# Patient Record
Sex: MALE | Race: WHITE | NOT HISPANIC OR LATINO | Employment: FULL TIME | URBAN - METROPOLITAN AREA
[De-identification: names, ages, dates, MRNs, and addresses within clinical notes are randomized per-mention and may not be internally consistent; named-entity substitution may affect disease eponyms.]

---

## 2020-09-02 ENCOUNTER — OFFICE VISIT (OUTPATIENT)
Dept: FAMILY MEDICINE CLINIC | Facility: CLINIC | Age: 25
End: 2020-09-02

## 2020-09-02 VITALS
DIASTOLIC BLOOD PRESSURE: 80 MMHG | TEMPERATURE: 98.6 F | HEIGHT: 76 IN | WEIGHT: 286.4 LBS | HEART RATE: 77 BPM | SYSTOLIC BLOOD PRESSURE: 132 MMHG | OXYGEN SATURATION: 98 % | BODY MASS INDEX: 34.88 KG/M2

## 2020-09-02 DIAGNOSIS — E53.8 VITAMIN B12 DEFICIENCY: ICD-10-CM

## 2020-09-02 DIAGNOSIS — R06.83 SNORING: ICD-10-CM

## 2020-09-02 DIAGNOSIS — E53.8 FOLIC ACID DEFICIENCY: ICD-10-CM

## 2020-09-02 DIAGNOSIS — E55.9 VITAMIN D DEFICIENCY: ICD-10-CM

## 2020-09-02 DIAGNOSIS — Z00.00 ENCOUNTER FOR ANNUAL PHYSICAL EXAM: Primary | ICD-10-CM

## 2020-09-02 PROCEDURE — 99385 PREV VISIT NEW AGE 18-39: CPT | Performed by: NURSE PRACTITIONER

## 2020-09-02 NOTE — PATIENT INSTRUCTIONS
Continue to work on healthy diet and exercise.  Follow up pending lab results.  Follow up as needed for problems or concerns.

## 2020-09-02 NOTE — PROGRESS NOTES
Subjective   Ignacio Shore is a 25 y.o. male.     Chief Complaint   Patient presents with   • Annual Exam     blood work        History of Present Illness   New patient, here to establish care; previous PCP Dr. Laureano, has not seen for 5 years; patient presents for CPE with non-fasting labs; no problems or concerns today; moderately healthy diet, eats a lot of spicy and greasy food; regular exercise, walks 3-4 miles per night at work; no recent dental visits; last eye exam last year, wears glasses; no problems hearing; immunizations: declines flu vaccine and pneumovax; colonoscopy never performed; no family history of colon cancer.    F/U Vitamin B12 deficiency: takes Vitamin B12 rarely.    Had Glucosuria in 2013 or so; negative work up at that time; had negative glucose tolerance test; will get shaky if does not eat.    The following portions of the patient's history were reviewed and updated as appropriate: allergies, current medications, past family history, past medical history, past social history, past surgical history and problem list.    No current outpatient medications on file prior to visit.     No current facility-administered medications on file prior to visit.        Past Medical History:   Diagnosis Date   • Folate deficiency    • Nasal fracture    • Right lateral epicondylitis 5/2/2016   • Vitamin B12 deficiency    • Vitamin D deficiency        Past Surgical History:   Procedure Laterality Date   • SHOULDER SURGERY Right 03/14/2019    repair of labral tears       Family History   Problem Relation Age of Onset   • Diabetes Father    • Hypertension Father    • Cancer Maternal Aunt         unknown    • Kidney disease Paternal Grandfather    • Diabetes Paternal Grandfather    • COPD Maternal Grandmother    • Coronary artery disease Maternal Grandfather         S/P CABG       Social History     Socioeconomic History   • Marital status: Significant Other     Spouse name: Not on file   • Number of children: Not  on file   • Years of education: Not on file   • Highest education level: Not on file   Tobacco Use   • Smoking status: Current Some Day Smoker     Packs/day: 0.25     Types: Cigarettes   • Smokeless tobacco: Never Used   Substance and Sexual Activity   • Alcohol use: Never     Frequency: Never   • Drug use: Never   • Sexual activity: Yes     Partners: Female     Comment: wife    Does not smoke daily; would rather smoke than drink.    Review of Systems   Constitutional: Negative for appetite change, chills, fever, unexpected weight gain and unexpected weight loss. Fatigue: at times; not necessarily unexplained, works 56 hours per week.   HENT: Negative for ear pain, sinus pressure, sore throat and trouble swallowing.    Eyes: Negative for blurred vision and pain.   Respiratory: Negative for cough, chest tightness and shortness of breath. Apnea: does snore.    Cardiovascular: Negative for chest pain, palpitations and leg swelling.   Gastrointestinal: Negative for abdominal pain, blood in stool, constipation, diarrhea, GERD and indigestion.   Endocrine: Negative for cold intolerance, heat intolerance and polydipsia.   Genitourinary: Negative for dysuria and frequency. Nocturia: gets up 1-2 times per night to urinate; no change in urinary stream; related to amount of fluid has drank.   Musculoskeletal: Negative for arthralgias (some arthritis in shoulder) and back pain.   Skin: Negative for rash and skin lesions.   Neurological: Negative for dizziness, syncope and headache.   Hematological: Does not bruise/bleed easily.   Psychiatric/Behavioral: Negative for depressed mood. Sleep disturbance: occasional trouble falling asleep. The patient is not nervous/anxious.      PHQ-2 Depression Screening  Little interest or pleasure in doing things? 0   Feeling down, depressed, or hopeless? 0   PHQ-2 Total Score 0     Objective   Vitals:    09/02/20 1507   BP: 132/80   BP Location: Right arm   Patient Position: Sitting   Cuff  "Size: Adult   Pulse: 77   Temp: 98.6 °F (37 °C)   SpO2: 98%   Weight: 130 kg (286 lb 6.4 oz)   Height: 193 cm (76\")     Body mass index is 34.86 kg/m².    Physical Exam   Constitutional: He is oriented to person, place, and time. Vital signs are normal. He appears well-developed and well-nourished. No distress.   HENT:   Head: Normocephalic and atraumatic.   Right Ear: Tympanic membrane and external ear normal.   Left Ear: Tympanic membrane and external ear normal.   Nose: Septal deviation (to right) present. Right sinus exhibits no maxillary sinus tenderness and no frontal sinus tenderness. Left sinus exhibits no maxillary sinus tenderness and no frontal sinus tenderness.   Mouth/Throat: Oropharynx is clear and moist and mucous membranes are normal. No oropharyngeal exudate or posterior oropharyngeal erythema.   Eyes: Pupils are equal, round, and reactive to light. Conjunctivae and EOM are normal.   Neck: Normal range of motion. Neck supple. Carotid bruit is not present. No tracheal deviation present. No thyroid mass and no thyromegaly present.   Cardiovascular: Normal rate, regular rhythm, normal heart sounds and intact distal pulses.   No murmur heard.  Pulmonary/Chest: Effort normal and breath sounds normal. No respiratory distress.   Abdominal: Soft. Bowel sounds are normal. There is no hepatosplenomegaly. There is no tenderness.   Musculoskeletal: Normal range of motion. He exhibits no edema.        Cervical back: He exhibits no bony tenderness.        Thoracic back: He exhibits no bony tenderness.        Lumbar back: He exhibits no bony tenderness.   Lymphadenopathy:     He has no cervical adenopathy.   Neurological: He is alert and oriented to person, place, and time. He has normal strength and normal reflexes. No cranial nerve deficit. Coordination and gait normal.   Skin: Skin is warm and dry. No rash noted. He is not diaphoretic.   Psychiatric: He has a normal mood and affect. His behavior is normal. " Judgment and thought content normal. Cognition and memory are normal.   Nursing note and vitals reviewed.        Assessment/Plan .  Problem List Items Addressed This Visit     Vitamin B12 deficiency    Relevant Orders    Vitamin B12 & Folate    Folic acid deficiency    Relevant Orders    Vitamin B12 & Folate    Vitamin D deficiency    Relevant Orders    Vitamin D 25 Hydroxy    Snoring    Relevant Orders    Ambulatory Referral to Sleep Medicine    CBC & Differential    BMI 34.0-34.9,adult    Current Assessment & Plan     Continue to work on healthy diet and exercise.           Other Visit Diagnoses     Encounter for annual physical exam    -  Primary    Relevant Orders    Comprehensive Metabolic Panel    CBC & Differential    Lipid Panel With LDL / HDL Ratio    Vitamin B12 & Folate    Vitamin D 25 Hydroxy          Return in about 1 year (around 9/2/2021) for Annual physical.or sooner if problems or concerns.  Impression: Health maintenance visit.  Currently, eats a moderately healthy diet and has a regular exercise routine.  Colorectal cancer screening: not indicated.  Prostate cancer screening: not indicated.  Screening lab work includes: CMP, lipid.  Immunizations: declines flu vaccine and Pneumovax.  Patient was advised to be evaluated by dentist.  Advice and education were given regarding nutrition and aerobic exercise.

## 2020-09-03 DIAGNOSIS — E55.9 VITAMIN D DEFICIENCY: Primary | ICD-10-CM

## 2020-09-03 LAB
25(OH)D3+25(OH)D2 SERPL-MCNC: 24.4 NG/ML (ref 30–100)
ALBUMIN SERPL-MCNC: 4.7 G/DL (ref 4.1–5.2)
ALBUMIN/GLOB SERPL: 1.6 {RATIO} (ref 1.2–2.2)
ALP SERPL-CCNC: 51 IU/L (ref 39–117)
ALT SERPL-CCNC: 41 IU/L (ref 0–44)
AST SERPL-CCNC: 23 IU/L (ref 0–40)
BASOPHILS # BLD AUTO: 0 X10E3/UL (ref 0–0.2)
BASOPHILS NFR BLD AUTO: 0 %
BILIRUB SERPL-MCNC: <0.2 MG/DL (ref 0–1.2)
BUN SERPL-MCNC: 12 MG/DL (ref 6–20)
BUN/CREAT SERPL: 13 (ref 9–20)
CALCIUM SERPL-MCNC: 9.4 MG/DL (ref 8.7–10.2)
CHLORIDE SERPL-SCNC: 103 MMOL/L (ref 96–106)
CHOLEST SERPL-MCNC: 182 MG/DL (ref 100–199)
CO2 SERPL-SCNC: 26 MMOL/L (ref 20–29)
CREAT SERPL-MCNC: 0.92 MG/DL (ref 0.76–1.27)
EOSINOPHIL # BLD AUTO: 0.2 X10E3/UL (ref 0–0.4)
EOSINOPHIL NFR BLD AUTO: 2 %
ERYTHROCYTE [DISTWIDTH] IN BLOOD BY AUTOMATED COUNT: 13.2 % (ref 11.6–15.4)
FOLATE SERPL-MCNC: 5.9 NG/ML
GLOBULIN SER CALC-MCNC: 2.9 G/DL (ref 1.5–4.5)
GLUCOSE SERPL-MCNC: 101 MG/DL (ref 65–99)
HCT VFR BLD AUTO: 43.2 % (ref 37.5–51)
HDLC SERPL-MCNC: 30 MG/DL
HGB BLD-MCNC: 14.7 G/DL (ref 13–17.7)
IMM GRANULOCYTES # BLD AUTO: 0 X10E3/UL (ref 0–0.1)
IMM GRANULOCYTES NFR BLD AUTO: 0 %
LDLC SERPL CALC-MCNC: 102 MG/DL (ref 0–99)
LDLC/HDLC SERPL: 3.4 RATIO (ref 0–3.6)
LYMPHOCYTES # BLD AUTO: 3.2 X10E3/UL (ref 0.7–3.1)
LYMPHOCYTES NFR BLD AUTO: 35 %
MCH RBC QN AUTO: 29.6 PG (ref 26.6–33)
MCHC RBC AUTO-ENTMCNC: 34 G/DL (ref 31.5–35.7)
MCV RBC AUTO: 87 FL (ref 79–97)
MONOCYTES # BLD AUTO: 0.7 X10E3/UL (ref 0.1–0.9)
MONOCYTES NFR BLD AUTO: 8 %
NEUTROPHILS # BLD AUTO: 5.1 X10E3/UL (ref 1.4–7)
NEUTROPHILS NFR BLD AUTO: 55 %
PLATELET # BLD AUTO: 296 X10E3/UL (ref 150–450)
POTASSIUM SERPL-SCNC: 4.6 MMOL/L (ref 3.5–5.2)
PROT SERPL-MCNC: 7.6 G/DL (ref 6–8.5)
RBC # BLD AUTO: 4.96 X10E6/UL (ref 4.14–5.8)
SODIUM SERPL-SCNC: 143 MMOL/L (ref 134–144)
TRIGL SERPL-MCNC: 294 MG/DL (ref 0–149)
VIT B12 SERPL-MCNC: 498 PG/ML (ref 232–1245)
VLDLC SERPL CALC-MCNC: 50 MG/DL (ref 5–40)
WBC # BLD AUTO: 9.2 X10E3/UL (ref 3.4–10.8)

## 2020-09-03 RX ORDER — FAMOTIDINE 20 MG
1 TABLET ORAL DAILY
Start: 2020-09-03 | End: 2021-08-27 | Stop reason: SDUPTHER

## 2020-12-14 ENCOUNTER — OFFICE VISIT (OUTPATIENT)
Dept: FAMILY MEDICINE CLINIC | Facility: CLINIC | Age: 25
End: 2020-12-14

## 2020-12-14 VITALS
HEART RATE: 81 BPM | WEIGHT: 285.6 LBS | BODY MASS INDEX: 34.78 KG/M2 | OXYGEN SATURATION: 98 % | DIASTOLIC BLOOD PRESSURE: 78 MMHG | SYSTOLIC BLOOD PRESSURE: 118 MMHG | HEIGHT: 76 IN | TEMPERATURE: 98.2 F

## 2020-12-14 DIAGNOSIS — N43.3 HYDROCELE, UNSPECIFIED HYDROCELE TYPE: ICD-10-CM

## 2020-12-14 DIAGNOSIS — M25.511 CHRONIC RIGHT SHOULDER PAIN: ICD-10-CM

## 2020-12-14 DIAGNOSIS — N50.811 TESTICULAR PAIN, RIGHT: Primary | ICD-10-CM

## 2020-12-14 DIAGNOSIS — G89.29 CHRONIC RIGHT SHOULDER PAIN: ICD-10-CM

## 2020-12-14 PROCEDURE — 99214 OFFICE O/P EST MOD 30 MIN: CPT | Performed by: NURSE PRACTITIONER

## 2020-12-14 RX ORDER — IBUPROFEN 800 MG/1
800 TABLET ORAL DAILY
COMMUNITY
Start: 2020-12-11 | End: 2021-01-26

## 2020-12-14 NOTE — PATIENT INSTRUCTIONS
Do not take both Diclofenac and Ibuprofen.  Continue ice/heat to right shoulder, whichever feels better.  Follow up pending lab results.  Follow up if symptoms persist or worsen.  Follow up as scheduled with urology.

## 2020-12-14 NOTE — PROGRESS NOTES
Subjective   Ignacio Shore is a 25 y.o. male.     Chief Complaint   Patient presents with   • hospital follow up     cyst on testicles    • Shoulder Pain     FMLA       History of Present Illness   Patient presents for ER follow up; pt went to [facility] on 12/11/20 with c/o right testicular pain; sent to ER for further evaluation; had US of scrotum and noted hydrocele, no testicular torsion or epididymitis; negative for GC/C; noted blood in urine at [facility], but not in ER; gave Rx for Diclofenac; has been taking Diclofenac BID and helps a little; still having discomfort in right testicle; has sensation of squeezing of right testicle; no noted bulging; was sitting when pain first started on 12/10/20, then worse next day at work, so went to [facility]; no noted hernia in ER; no change in pain in last few days; no dysuria; no urinary frequency; no pain with lifting; had swelling of right testicle when went to [facility], but improved later in day; right side was double size of other when decided to go to the ER; off work at this point; ER recommended follow up with urology; has upcoming appointment with urology on 12/16/20.    Also, c/o right shoulder pain; has been waking up 2-3 times per month with decreased ROM; will take a couple of hours to get ROM back; had surgery on right shoulder last year; had labral tear repair in past; no recent follow up with ortho; at last appointment told at best recovery could get; told arthritis will contribute to symptoms; works as a  and does variety of jobs; does lifting, pushing, pulling, etc at times; works at [employer]; gets flares of shoulder pain about once per month; flare lasts for few days; symptoms will improve with rest; takes OTC NSAIDs as needed and helps some; will take Ibuprofen 200-400 mg as needed; tries ice/heat at times and helps; only has weakness in right shoulder when pain is flared; does repetitive motion at work; arthritis is not workman's comp as was  pre-existing; needs [facility] paper work completed.    The following portions of the patient's history were reviewed and updated as appropriate: allergies, current medications, past family history, past medical history, past social history, past surgical history and problem list.    Current Outpatient Medications on File Prior to Visit   Medication Sig   • diclofenac (VOLTAREN) 50 MG EC tablet Take 50 mg by mouth 2 (two) times a day.   • ibuprofen (ADVIL,MOTRIN) 800 MG tablet Take 800 mg by mouth Daily.   • Vitamin D, Cholecalciferol, 25 MCG (1000 UT) capsule Take 1 capsule by mouth Daily.     No current facility-administered medications on file prior to visit.        Past Medical History:   Diagnosis Date   • Abnormal LFTs (liver function tests) 7/12/2016   • Folate deficiency    • Nasal fracture    • Renal glycosuria 8/6/2014   • Right lateral epicondylitis 5/2/2016   • Vitamin B12 deficiency    • Vitamin D deficiency        Past Surgical History:   Procedure Laterality Date   • SHOULDER SURGERY Right 03/14/2019    repair of labral tears       Family History   Problem Relation Age of Onset   • Diabetes Father    • Hypertension Father    • Cancer Maternal Aunt         unknown    • Kidney disease Paternal Grandfather    • Diabetes Paternal Grandfather    • COPD Maternal Grandmother    • Coronary artery disease Maternal Grandfather         S/P CABG       Social History     Socioeconomic History   • Marital status: Significant Other     Spouse name: Not on file   • Number of children: Not on file   • Years of education: Not on file   • Highest education level: Not on file   Tobacco Use   • Smoking status: Current Some Day Smoker     Packs/day: 0.25     Types: Cigarettes   • Smokeless tobacco: Never Used   Substance and Sexual Activity   • Alcohol use: Never     Frequency: Never   • Drug use: Never   • Sexual activity: Yes     Partners: Female     Comment: wife        Review of Systems   Constitutional: Negative for  "appetite change, chills, fatigue, fever, unexpected weight gain and unexpected weight loss.   HENT: Negative for ear pain and sore throat.    Eyes: Negative for blurred vision (wears glasses) and pain.   Respiratory: Negative for cough, chest tightness and shortness of breath.    Cardiovascular: Negative for chest pain, palpitations and leg swelling.   Gastrointestinal: Negative for abdominal pain, blood in stool, constipation, diarrhea, GERD and indigestion.   Endocrine: Negative for polydipsia.   Genitourinary: Positive for hematuria. Negative for difficulty urinating. Scrotal swelling: mild, not as bad since 12/11/20.   Musculoskeletal: Negative for back pain.   Skin: Negative for rash.   Neurological: Negative for dizziness, syncope and headache.   Hematological: Does not bruise/bleed easily.       Objective   Vitals:    12/14/20 1033   BP: 118/78   BP Location: Left arm   Patient Position: Sitting   Cuff Size: Adult   Pulse: 81   Temp: 98.2 °F (36.8 °C)   SpO2: 98%   Weight: 130 kg (285 lb 9.6 oz)   Height: 193 cm (76\")     Body mass index is 34.76 kg/m².    Physical Exam  Vitals signs and nursing note reviewed.   Constitutional:       General: He is not in acute distress.     Appearance: He is well-developed. He is not diaphoretic.   HENT:      Head: Normocephalic.      Right Ear: External ear normal.      Left Ear: External ear normal.   Eyes:      Conjunctiva/sclera: Conjunctivae normal.   Neck:      Musculoskeletal: Normal range of motion and neck supple.      Vascular: No carotid bruit.   Cardiovascular:      Rate and Rhythm: Normal rate and regular rhythm.      Pulses: Normal pulses.      Heart sounds: Normal heart sounds. No murmur.   Pulmonary:      Effort: Pulmonary effort is normal. No respiratory distress.      Breath sounds: Normal breath sounds.   Abdominal:      General: Bowel sounds are normal.      Palpations: Abdomen is soft. There is no hepatomegaly or splenomegaly.      Tenderness: There is " no abdominal tenderness. There is no right CVA tenderness or left CVA tenderness.      Hernia: There is no hernia in the left inguinal area or right inguinal area.   Genitourinary:     Scrotum/Testes:         Right: Tenderness (mild, posterior) present. Mass or swelling not present.         Left: Mass, tenderness or swelling not present.      Epididymis:      Right: Not inflamed.      Left: Not inflamed.      Comments: MA present during exam  Musculoskeletal:      Right shoulder: He exhibits normal range of motion (negative empty can; negative drop arm) and no tenderness.      Right lower leg: No edema.      Left lower leg: No edema.   Skin:     General: Skin is warm and dry.      Findings: No rash.   Neurological:      Mental Status: He is alert and oriented to person, place, and time.      Gait: Gait is intact.   Psychiatric:         Mood and Affect: Mood normal.         Behavior: Behavior normal.         Thought Content: Thought content normal.         Cognition and Memory: Cognition normal.         Judgment: Judgment normal.         Lab Results   Component Value Date    WBC 9.2 09/02/2020    RBC 4.96 09/02/2020    HGB 14.7 09/02/2020    HCT 43.2 09/02/2020    MCV 87 09/02/2020    MCH 29.6 09/02/2020    MCHC 34.0 09/02/2020    RDW 13.2 09/02/2020     09/02/2020    NEUTRORELPCT 55 09/02/2020    LYMPHORELPCT 35 09/02/2020    MONORELPCT 8 09/02/2020    EOSRELPCT 2 09/02/2020    BASORELPCT 0 09/02/2020    NEUTROABS 5.1 09/02/2020    LYMPHSABS 3.2 (H) 09/02/2020    MONOSABS 0.7 09/02/2020    EOSABS 0.2 09/02/2020    BASOSABS 0.0 09/02/2020     Lab Results   Component Value Date    BUN 12 09/02/2020    CREATININE 0.92 09/02/2020    EGFRIFNONA 114 09/02/2020    EGFRIFAFRI 132 09/02/2020    BCR 13 09/02/2020    K 4.6 09/02/2020    CO2 26 09/02/2020    CALCIUM 9.4 09/02/2020    PROTENTOTREF 7.6 09/02/2020    ALBUMIN 4.7 09/02/2020    LABIL2 1.6 09/02/2020    AST 23 09/02/2020    ALT 41 09/02/2020      Lab Results    Component Value Date    CHLPL 182 09/02/2020    TRIG 294 (H) 09/02/2020    HDL 30 (L) 09/02/2020    VLDL 50 (H) 09/02/2020     (H) 09/02/2020     Records reviewed from [facility] on 12/11/20.    Assessment/Plan .  Problem List Items Addressed This Visit     Chronic right shoulder pain    Current Assessment & Plan     Continue ice/heat to right shoulder, whichever feels better.         Testicular pain, right - Primary    Current Assessment & Plan     Do not take both Diclofenac and Ibuprofen.  Follow up as scheduled with urology on 12/16/20.         Relevant Orders    Comprehensive Metabolic Panel    CBC & Differential    Hydrocele    Current Assessment & Plan     Follow up as scheduled with urology.               Return if symptoms worsen or fail to improve.  Off work until sees urology on 11/16/20.  Will complete FMLA paper work when received.

## 2020-12-15 PROBLEM — N43.3 HYDROCELE: Status: ACTIVE | Noted: 2020-12-15

## 2020-12-15 LAB
ALBUMIN SERPL-MCNC: 4.8 G/DL (ref 4.1–5.2)
ALBUMIN/GLOB SERPL: 1.7 {RATIO} (ref 1.2–2.2)
ALP SERPL-CCNC: 54 IU/L (ref 39–117)
ALT SERPL-CCNC: 32 IU/L (ref 0–44)
AST SERPL-CCNC: 20 IU/L (ref 0–40)
BASOPHILS # BLD AUTO: 0 X10E3/UL (ref 0–0.2)
BASOPHILS NFR BLD AUTO: 0 %
BILIRUB SERPL-MCNC: <0.2 MG/DL (ref 0–1.2)
BUN SERPL-MCNC: 6 MG/DL (ref 6–20)
BUN/CREAT SERPL: 8 (ref 9–20)
CALCIUM SERPL-MCNC: 9.8 MG/DL (ref 8.7–10.2)
CHLORIDE SERPL-SCNC: 103 MMOL/L (ref 96–106)
CO2 SERPL-SCNC: 25 MMOL/L (ref 20–29)
CREAT SERPL-MCNC: 0.8 MG/DL (ref 0.76–1.27)
EOSINOPHIL # BLD AUTO: 0.2 X10E3/UL (ref 0–0.4)
EOSINOPHIL NFR BLD AUTO: 2 %
ERYTHROCYTE [DISTWIDTH] IN BLOOD BY AUTOMATED COUNT: 13 % (ref 11.6–15.4)
GLOBULIN SER CALC-MCNC: 2.9 G/DL (ref 1.5–4.5)
GLUCOSE SERPL-MCNC: 79 MG/DL (ref 65–99)
HCT VFR BLD AUTO: 43.8 % (ref 37.5–51)
HGB BLD-MCNC: 14.9 G/DL (ref 13–17.7)
IMM GRANULOCYTES # BLD AUTO: 0 X10E3/UL (ref 0–0.1)
IMM GRANULOCYTES NFR BLD AUTO: 0 %
LYMPHOCYTES # BLD AUTO: 3.1 X10E3/UL (ref 0.7–3.1)
LYMPHOCYTES NFR BLD AUTO: 39 %
MCH RBC QN AUTO: 28.7 PG (ref 26.6–33)
MCHC RBC AUTO-ENTMCNC: 34 G/DL (ref 31.5–35.7)
MCV RBC AUTO: 84 FL (ref 79–97)
MONOCYTES # BLD AUTO: 0.7 X10E3/UL (ref 0.1–0.9)
MONOCYTES NFR BLD AUTO: 9 %
NEUTROPHILS # BLD AUTO: 4 X10E3/UL (ref 1.4–7)
NEUTROPHILS NFR BLD AUTO: 50 %
PLATELET # BLD AUTO: 317 X10E3/UL (ref 150–450)
POTASSIUM SERPL-SCNC: 4.8 MMOL/L (ref 3.5–5.2)
PROT SERPL-MCNC: 7.7 G/DL (ref 6–8.5)
RBC # BLD AUTO: 5.19 X10E6/UL (ref 4.14–5.8)
SODIUM SERPL-SCNC: 142 MMOL/L (ref 134–144)
WBC # BLD AUTO: 8.1 X10E3/UL (ref 3.4–10.8)

## 2020-12-16 ENCOUNTER — TELEPHONE (OUTPATIENT)
Dept: FAMILY MEDICINE CLINIC | Facility: CLINIC | Age: 25
End: 2020-12-16

## 2020-12-16 NOTE — TELEPHONE ENCOUNTER
Patient called to dennis [place] know that he went to the urologist today and they have diagnosed him with epididymitis. He has been started on antibiotics.

## 2021-01-26 ENCOUNTER — OFFICE VISIT (OUTPATIENT)
Dept: FAMILY MEDICINE CLINIC | Facility: CLINIC | Age: 26
End: 2021-01-26

## 2021-01-26 VITALS
SYSTOLIC BLOOD PRESSURE: 122 MMHG | HEART RATE: 89 BPM | TEMPERATURE: 97.8 F | HEIGHT: 76 IN | WEIGHT: 291.2 LBS | BODY MASS INDEX: 35.46 KG/M2 | OXYGEN SATURATION: 95 % | DIASTOLIC BLOOD PRESSURE: 78 MMHG

## 2021-01-26 DIAGNOSIS — N45.1 EPIDIDYMITIS, RIGHT: Primary | ICD-10-CM

## 2021-01-26 DIAGNOSIS — N50.811 TESTICULAR PAIN, RIGHT: ICD-10-CM

## 2021-01-26 PROCEDURE — 99213 OFFICE O/P EST LOW 20 MIN: CPT | Performed by: NURSE PRACTITIONER

## 2021-01-26 NOTE — PATIENT INSTRUCTIONS
Continue to avoid lifting.  Follow up as schedule with urology.  Follow up if symptoms persist or worsen.

## 2021-01-26 NOTE — PROGRESS NOTES
Subjective   Ignacio Shore is a 25 y.o. male.     Chief Complaint   Patient presents with   • Male  Problem     needs FMLA completed for upcoming surgery       History of Present Illness   Patient presents for follow up epididymitis; saw urology; treated for epididymitis; started on Doxycycline and Oxaprozin twice daily; took medication and no improvement of symptoms; had follow up and then did a block; did some tests and told needs surgery for epididymis removal; the more active he is, the more discomfort he has; did self check last night as instructed and noted some swelling of epididymis; will report to urology; has upcoming surgery on 2/5/21; takes Daypro and Tylenol as needed and help some; still having discomfort; symptoms worse with standing, bending, and walking; has been trying to avoid lifting at work; needs FMLA paper work completed to be off work until surgery; cannot return to work after test for COVID-19 on 1/28/21; needs off work until surgery; urology will complete FMLA post op.    The following portions of the patient's history were reviewed and updated as appropriate: allergies, current medications, past family history, past medical history, past social history, past surgical history and problem list.    Current Outpatient Medications on File Prior to Visit   Medication Sig   • oxaprozin (DAYPRO) 600 MG tablet Take 600 mg by mouth Every 12 (Twelve) Hours.   • Vitamin D, Cholecalciferol, 25 MCG (1000 UT) capsule Take 1 capsule by mouth Daily.   • [DISCONTINUED] diclofenac (VOLTAREN) 50 MG EC tablet Take 50 mg by mouth 2 (two) times a day.   • [DISCONTINUED] ibuprofen (ADVIL,MOTRIN) 800 MG tablet Take 800 mg by mouth Daily.     No current facility-administered medications on file prior to visit.        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/2014   • Right lateral  epicondylitis 5/2/2016   • Vitamin B12 deficiency    • Vitamin D deficiency        Past Surgical History:   Procedure Laterality Date   • SHOULDER SURGERY Right 03/14/2019    repair of labral tears       Family History   Problem Relation Age of Onset   • Diabetes Father    • Hypertension Father    • Cancer Maternal Aunt         unknown    • Kidney disease Paternal Grandfather    • Diabetes Paternal Grandfather    • COPD Maternal Grandmother    • Coronary artery disease Maternal Grandfather         S/P CABG       Social History     Socioeconomic History   • Marital status: Significant Other     Spouse name: Not on file   • Number of children: Not on file   • Years of education: Not on file   • Highest education level: Not on file   Tobacco Use   • Smoking status: Current Some Day Smoker     Packs/day: 0.25     Types: Cigarettes   • Smokeless tobacco: Never Used   Substance and Sexual Activity   • Alcohol use: Never     Frequency: Never   • Drug use: Never   • Sexual activity: Yes     Partners: Female     Comment: wife        Review of Systems   Constitutional: Negative for appetite change (some decrease in appetite when having pain), chills, fever, unexpected weight gain and unexpected weight loss. Fatigue: no unexplained fatigue.   HENT: Negative for ear pain, sinus pressure, sore throat and trouble swallowing.    Eyes: Negative for blurred vision and pain.   Respiratory: Negative for cough, chest tightness and shortness of breath (no dyspnea).    Cardiovascular: Negative for chest pain, palpitations and leg swelling.   Gastrointestinal: Negative for abdominal pain, blood in stool, constipation, diarrhea, GERD and indigestion.   Endocrine: Negative for polydipsia.   Genitourinary: Positive for scrotal swelling (right sided at times, rest helps). Negative for dysuria, frequency (on occasion) and hematuria.   Musculoskeletal: Negative for back pain.   Skin: Negative for rash.   Neurological: Negative for dizziness,  "syncope and headache.   Hematological: Does not bruise/bleed easily.       Objective   Vitals:    01/26/21 1303   BP: 122/78   BP Location: Right arm   Patient Position: Sitting   Cuff Size: Adult   Pulse: 89   Temp: 97.8 °F (36.6 °C)   SpO2: 95%   Weight: 132 kg (291 lb 3.2 oz)   Height: 193 cm (76\")     Body mass index is 35.45 kg/m².    Physical Exam  Vitals signs and nursing note reviewed.   Constitutional:       General: He is not in acute distress.     Appearance: He is well-developed. He is not diaphoretic.   HENT:      Head: Normocephalic.      Right Ear: External ear normal.      Left Ear: External ear normal.   Eyes:      Conjunctiva/sclera: Conjunctivae normal.   Neck:      Musculoskeletal: Normal range of motion and neck supple.      Vascular: No carotid bruit.   Cardiovascular:      Rate and Rhythm: Normal rate and regular rhythm.      Pulses: Normal pulses.      Heart sounds: Normal heart sounds. No murmur.      Comments: No JVD  Pulmonary:      Effort: Pulmonary effort is normal. No respiratory distress.      Breath sounds: Normal breath sounds.   Abdominal:      General: Bowel sounds are normal.      Palpations: Abdomen is soft. There is no hepatomegaly or splenomegaly.      Tenderness: There is no abdominal tenderness.   Musculoskeletal:      Right lower leg: No edema.      Left lower leg: No edema.   Skin:     General: Skin is warm and dry.      Findings: No rash.   Neurological:      Mental Status: He is alert and oriented to person, place, and time.      Gait: Gait is intact.   Psychiatric:         Mood and Affect: Mood normal.         Behavior: Behavior normal.         Thought Content: Thought content normal.         Cognition and Memory: Cognition normal.         Judgment: Judgment normal.         Lab Results   Component Value Date    WBC 8.1 12/14/2020    RBC 5.19 12/14/2020    HGB 14.9 12/14/2020    HCT 43.8 12/14/2020    MCV 84 12/14/2020    MCH 28.7 12/14/2020    MCHC 34.0 12/14/2020    RDW " 13.0 12/14/2020     12/14/2020    NEUTRORELPCT 50 12/14/2020    LYMPHORELPCT 39 12/14/2020    MONORELPCT 9 12/14/2020    EOSRELPCT 2 12/14/2020    BASORELPCT 0 12/14/2020    NEUTROABS 4.0 12/14/2020    LYMPHSABS 3.1 12/14/2020    MONOSABS 0.7 12/14/2020    EOSABS 0.2 12/14/2020    BASOSABS 0.0 12/14/2020     Lab Results   Component Value Date    BUN 6 12/14/2020    CREATININE 0.80 12/14/2020    EGFRIFNONA 123 12/14/2020    EGFRIFAFRI 143 12/14/2020    BCR 8 (L) 12/14/2020    K 4.8 12/14/2020    CO2 25 12/14/2020    CALCIUM 9.8 12/14/2020    PROTENTOTREF 7.7 12/14/2020    ALBUMIN 4.8 12/14/2020    LABIL2 1.7 12/14/2020    AST 20 12/14/2020    ALT 32 12/14/2020      Lab Results   Component Value Date    CHLPL 182 09/02/2020    TRIG 294 (H) 09/02/2020    HDL 30 (L) 09/02/2020    VLDL 50 (H) 09/02/2020     (H) 09/02/2020         Assessment/Plan .  Problem List Items Addressed This Visit     Testicular pain, right    Current Assessment & Plan     Continue Daypro and Tylenol as needed.         Epididymitis, right - Primary    Current Assessment & Plan     Continue to monitor symptoms.  Follow up as scheduled with urology.               Return if symptoms worsen or fail to improve.   Paper work completed for [facility] for upcoming surgery.  Cleared for surgery from medical standpoint.  Will request office note from [facility], Dr. Soares.       COVID-19 Precautions - Patient was compliant in wearing a mask. When I saw the patient, I used appropriate personal protective equipment (PPE) including mask, gloves, and eye shield (standard procedure).  Hand hygiene was completed before and after seeing the patient.

## 2021-02-05 PROCEDURE — 88304 TISSUE EXAM BY PATHOLOGIST: CPT | Performed by: UROLOGY

## 2021-02-08 ENCOUNTER — LAB REQUISITION (OUTPATIENT)
Dept: LAB | Facility: HOSPITAL | Age: 26
End: 2021-02-08

## 2021-02-08 DIAGNOSIS — N50.82 SCROTAL PAIN: ICD-10-CM

## 2021-02-08 DIAGNOSIS — N45.1 EPIDIDYMITIS: ICD-10-CM

## 2021-02-09 LAB
LAB AP CASE REPORT: NORMAL
PATH REPORT.FINAL DX SPEC: NORMAL
PATH REPORT.GROSS SPEC: NORMAL

## 2021-08-25 ENCOUNTER — OFFICE VISIT (OUTPATIENT)
Dept: FAMILY MEDICINE CLINIC | Facility: CLINIC | Age: 26
End: 2021-08-25

## 2021-08-25 VITALS
HEART RATE: 79 BPM | SYSTOLIC BLOOD PRESSURE: 128 MMHG | HEIGHT: 76 IN | WEIGHT: 294 LBS | BODY MASS INDEX: 35.8 KG/M2 | OXYGEN SATURATION: 96 % | TEMPERATURE: 99.1 F | DIASTOLIC BLOOD PRESSURE: 82 MMHG

## 2021-08-25 DIAGNOSIS — Z00.00 ENCOUNTER FOR ANNUAL PHYSICAL EXAM: Primary | ICD-10-CM

## 2021-08-25 DIAGNOSIS — E53.8 VITAMIN B12 DEFICIENCY: ICD-10-CM

## 2021-08-25 DIAGNOSIS — R06.83 SNORING: ICD-10-CM

## 2021-08-25 DIAGNOSIS — G47.9 SLEEP DISTURBANCE: ICD-10-CM

## 2021-08-25 DIAGNOSIS — G89.29 CHRONIC RIGHT SHOULDER PAIN: ICD-10-CM

## 2021-08-25 DIAGNOSIS — N50.811 TESTICULAR PAIN, RIGHT: ICD-10-CM

## 2021-08-25 DIAGNOSIS — M25.511 CHRONIC RIGHT SHOULDER PAIN: ICD-10-CM

## 2021-08-25 DIAGNOSIS — E66.9 CLASS 2 OBESITY WITHOUT SERIOUS COMORBIDITY WITH BODY MASS INDEX (BMI) OF 35.0 TO 35.9 IN ADULT, UNSPECIFIED OBESITY TYPE: ICD-10-CM

## 2021-08-25 DIAGNOSIS — E55.9 VITAMIN D DEFICIENCY: ICD-10-CM

## 2021-08-25 PROCEDURE — 99395 PREV VISIT EST AGE 18-39: CPT | Performed by: NURSE PRACTITIONER

## 2021-08-25 RX ORDER — METHYLPREDNISOLONE 4 MG/1
TABLET ORAL
COMMUNITY
Start: 2021-08-20

## 2021-08-25 NOTE — PATIENT INSTRUCTIONS
Continue to work on healthy diet and exercise.  Continue Ibuprofen 800 mg three times daily as needed during flares of shoulder pain.  Follow up pending lab results.  Follow up if symptoms persist or worsen.

## 2021-08-25 NOTE — PROGRESS NOTES
"Subjective   Ignacio Shore is a 26 y.o. male.     Chief Complaint   Patient presents with   • Annual Exam       History of Present Illness   Patient presents for CPE with fasting labs; pretty healthy diet, doing better recently; no formal exercise, walks for 12 hours nightly at work; no recent dental visits; last eye exam about 1 year ago, has upcoming eye exam; wears glasses; no problems hearing; immunizations: declines Pneumovax; colonoscopy never performed; no family history of colon cancer.    F/U right shoulder pain: has seen ortho through workmans comp in past for shoulder injury; has arthritis in right shoulder and gets flares at times; these symptoms different than when had injury; will have decreased strength in right arm at times; had some discomfort last week, has not had time to rest and recover since work has been busy; then had to do some more physical jobs at work on 8/20/21 and symptoms worse; does repetitive motion at work; had to leave work due to pain on 8/20/21; had taken Ibuprofen 400 mg, then 2 hours later took Tylenol 1000 mg, then 2 hours later took 800 mg of Ibuprofen and did not help, so had to leave work; put ice on shoulder and helped some; will have decreased ROM of shoulder after increased activity; has FMLA for flares of shoulder pain, but has used too many days this month; has been more busy at work, so has not been getting enough rest between shifts; works 12 hour shifts; needs FMLA paper work amended.     Also, c/o snoring; has been having night terrors and waking up in \"defense mode;\" would like referral for sleep study.    F/U right testicular pain: saw urology; had surgery--epididyectomy; still having flares of pain when active; not constant pain, but will have flares at times; takes Daypro as needed for discomfort and helps.    The following portions of the patient's history were reviewed and updated as appropriate: allergies, current medications, past family history, past medical " history, past social history, past surgical history and problem list.    Current Outpatient Medications on File Prior to Visit   Medication Sig   • Cyanocobalamin (VITAMIN B-12 PO) Take 1 tablet by mouth Daily As Needed.   • methylPREDNISolone (MEDROL) 4 MG dose pack follow package directions   • oxaprozin (DAYPRO) 600 MG tablet Take 600 mg by mouth Every 12 (Twelve) Hours.   • Vitamin D, Cholecalciferol, 25 MCG (1000 UT) capsule Take 1 capsule by mouth Daily.     No current facility-administered medications on file prior to visit.        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/2014   • Right lateral epicondylitis 5/2/2016   • Vitamin B12 deficiency    • Vitamin D deficiency        Past Surgical History:   Procedure Laterality Date   • EPIDIDYMECTOMY  02/2021   • SHOULDER SURGERY Right 03/14/2019    repair of labral tears       Family History   Problem Relation Age of Onset   • Diabetes Father    • Hypertension Father    • Cancer Maternal Aunt         unknown    • Kidney disease Paternal Grandfather    • Diabetes Paternal Grandfather    • COPD Maternal Grandmother    • Coronary artery disease Maternal Grandfather         S/P CABG       Social History     Socioeconomic History   • Marital status: Significant Other     Spouse name: Not on file   • Number of children: Not on file   • Years of education: Not on file   • Highest education level: Not on file   Tobacco Use   • Smoking status: Current Some Day Smoker     Packs/day: 0.25     Types: Cigarettes   • Smokeless tobacco: Never Used   Substance and Sexual Activity   • Alcohol use: Never   • Drug use: Never   • Sexual activity: Yes     Partners: Female     Comment: wife        Review of Systems   Constitutional: Positive for fatigue. Negative for appetite change, chills, fever, unexpected weight gain and unexpected weight loss.   HENT:  "Negative for ear pain, sinus pressure, sore throat and trouble swallowing.    Eyes: Negative for blurred vision and pain.   Respiratory: Negative for cough, chest tightness and shortness of breath.    Cardiovascular: Negative for chest pain, palpitations and leg swelling.   Gastrointestinal: Negative for abdominal pain, blood in stool, constipation, diarrhea, GERD and indigestion.   Endocrine: Negative for cold intolerance, heat intolerance and polydipsia.   Genitourinary: Negative for dysuria and frequency. Nocturia: gets up once per night to urinate, drinks liquids before bed.   Musculoskeletal: Negative for back pain (mild in lower back, nothing unexplained with job). Arthralgias: right shoulder.   Skin: Negative for rash and skin lesions.   Neurological: Negative for dizziness, syncope, light-headedness and headache.   Hematological: Does not bruise/bleed easily.   Psychiatric/Behavioral: Negative for depressed mood. Sleep disturbance: see HPI. Nervous/anxious: mild anxiety, able to work through, normal.        Objective   Vitals:    08/25/21 1422   BP: 128/82   BP Location: Left arm   Patient Position: Sitting   Cuff Size: Large Adult   Pulse: 79   Temp: 99.1 °F (37.3 °C)   TempSrc: Infrared   SpO2: 96%   Weight: 133 kg (294 lb)   Height: 193 cm (76\")   PainSc:   2   PainLoc: Shoulder     Body mass index is 35.79 kg/m².    Physical Exam  Vitals and nursing note reviewed.   Constitutional:       General: He is not in acute distress.     Appearance: He is well-developed and well-groomed. He is not diaphoretic.   HENT:      Head: Normocephalic and atraumatic.      Jaw: No tenderness or pain on movement.      Right Ear: Tympanic membrane and external ear normal. No decreased hearing noted.      Left Ear: Tympanic membrane and external ear normal. No decreased hearing noted.      Nose: Nose normal.      Right Sinus: No maxillary sinus tenderness or frontal sinus tenderness.      Left Sinus: No maxillary sinus " tenderness or frontal sinus tenderness.      Mouth/Throat:      Mouth: Mucous membranes are moist.      Pharynx: No oropharyngeal exudate or posterior oropharyngeal erythema.   Eyes:      Extraocular Movements: Extraocular movements intact.      Conjunctiva/sclera: Conjunctivae normal.      Pupils: Pupils are equal, round, and reactive to light.   Neck:      Thyroid: No thyroid mass or thyromegaly.      Vascular: No carotid bruit.      Trachea: No tracheal deviation.   Cardiovascular:      Rate and Rhythm: Normal rate and regular rhythm.      Pulses: Normal pulses.      Heart sounds: Normal heart sounds. No murmur heard.     Pulmonary:      Effort: Pulmonary effort is normal. No respiratory distress.      Breath sounds: Normal breath sounds.   Abdominal:      General: Bowel sounds are normal.      Palpations: Abdomen is soft. There is no hepatomegaly or splenomegaly.      Tenderness: There is no abdominal tenderness. There is no guarding.   Musculoskeletal:         General: Normal range of motion.      Right shoulder: Tenderness (with palpation of anterior shoulder) present. Normal range of motion (negative empty can, negative drop arm). Normal pulse.      Left shoulder: Normal pulse.      Cervical back: Normal range of motion and neck supple. No bony tenderness.      Thoracic back: No bony tenderness.      Lumbar back: No bony tenderness.      Right lower leg: No edema.      Left lower leg: No edema.   Lymphadenopathy:      Cervical: No cervical adenopathy.   Skin:     General: Skin is warm and dry.      Findings: No rash.   Neurological:      Mental Status: He is alert and oriented to person, place, and time.      Cranial Nerves: No cranial nerve deficit.      Motor: Motor function is intact.      Coordination: Coordination normal.      Gait: Gait normal.      Deep Tendon Reflexes: Reflexes are normal and symmetric.   Psychiatric:         Mood and Affect: Mood normal.         Behavior: Behavior normal.          Thought Content: Thought content normal.         Cognition and Memory: Cognition normal.         Judgment: Judgment normal.         Lab Results   Component Value Date    WBC 8.1 12/14/2020    RBC 5.19 12/14/2020    HGB 14.9 12/14/2020    HCT 43.8 12/14/2020    MCV 84 12/14/2020    MCH 28.7 12/14/2020    MCHC 34.0 12/14/2020    RDW 13.0 12/14/2020     12/14/2020    NEUTRORELPCT 50 12/14/2020    LYMPHORELPCT 39 12/14/2020    MONORELPCT 9 12/14/2020    EOSRELPCT 2 12/14/2020    BASORELPCT 0 12/14/2020    NEUTROABS 4.0 12/14/2020    LYMPHSABS 3.1 12/14/2020    MONOSABS 0.7 12/14/2020    EOSABS 0.2 12/14/2020    BASOSABS 0.0 12/14/2020     Lab Results   Component Value Date    BUN 6 12/14/2020    CREATININE 0.80 12/14/2020    EGFRIFNONA 123 12/14/2020    EGFRIFAFRI 143 12/14/2020    BCR 8 (L) 12/14/2020    K 4.8 12/14/2020    CO2 25 12/14/2020    CALCIUM 9.8 12/14/2020    PROTENTOTREF 7.7 12/14/2020    ALBUMIN 4.8 12/14/2020    LABIL2 1.7 12/14/2020    AST 20 12/14/2020    ALT 32 12/14/2020      Lab Results   Component Value Date    CHLPL 182 09/02/2020    TRIG 294 (H) 09/02/2020    HDL 30 (L) 09/02/2020    VLDL 50 (H) 09/02/2020     (H) 09/02/2020         Assessment/Plan .  Problem List Items Addressed This Visit     Vitamin B12 deficiency    Relevant Orders    Vitamin B12 & Folate    Vitamin D deficiency    Relevant Orders    Vitamin D 25 Hydroxy    Snoring    Relevant Orders    Ambulatory Referral to Sleep Medicine    Class 2 obesity without serious comorbidity with body mass index (BMI) of 35.0 to 35.9 in adult    Current Assessment & Plan     Patient's (Body mass index is 35.79 kg/m².) indicates that they are morbidly obese (BMI > 40 or > 35 with obesity - related health condition) with health conditions that include diabetes mellitus and GERD . Weight is unchanged. BMI is is above average; BMI management plan is completed. We discussed portion control and increasing exercise.          Chronic right  shoulder pain    Current Assessment & Plan     Continue Ibuprofen 800 mg three times daily as needed during flares of shoulder pain.         Relevant Orders    Comprehensive Metabolic Panel    CBC & Differential    Ambulatory Referral to Orthopedic Surgery (Completed)    Testicular pain, right    Current Assessment & Plan     Follow up as scheduled with urology.         Sleep disturbance    Relevant Orders    Ambulatory Referral to Sleep Medicine      Other Visit Diagnoses     Encounter for annual physical exam    -  Primary    Relevant Orders    Hepatitis C Antibody    Comprehensive Metabolic Panel    Lipid Panel With LDL / HDL Ratio    CBC & Differential          Return if symptoms worsen or fail to improve.  Impression: Health maintenance visit.  Currently, eating a pretty healthy diet recently and has a fair exercise routine, no formal exercise.  Colorectal cancer screening: not indicated.  Screening lab work includes: CMP, lipid.  Immunizations: declines Pneumovax; risks and benefits of immunizations were discussed with patient.  Patient was advised to be evaluated by ophthalmology and dentist.  Advice and education were given regarding nutrition and aerobic exercise.       COVID-19 Precautions - Patient was compliant in wearing a mask. When I saw the patient, I used appropriate personal protective equipment (PPE) including mask, gloves, and eye shield (standard procedure).  Hand hygiene was completed before and after seeing the patient.

## 2021-08-26 PROBLEM — E66.9 CLASS 2 OBESITY WITHOUT SERIOUS COMORBIDITY WITH BODY MASS INDEX (BMI) OF 35.0 TO 35.9 IN ADULT: Status: ACTIVE | Noted: 2020-09-02

## 2021-08-26 PROBLEM — E66.812 CLASS 2 OBESITY WITHOUT SERIOUS COMORBIDITY WITH BODY MASS INDEX (BMI) OF 35.0 TO 35.9 IN ADULT: Status: ACTIVE | Noted: 2020-09-02

## 2021-08-26 LAB
25(OH)D3+25(OH)D2 SERPL-MCNC: 21.3 NG/ML (ref 30–100)
ALBUMIN SERPL-MCNC: 4.8 G/DL (ref 4.1–5.2)
ALBUMIN/GLOB SERPL: 1.5 {RATIO} (ref 1.2–2.2)
ALP SERPL-CCNC: 53 IU/L (ref 48–121)
ALT SERPL-CCNC: 33 IU/L (ref 0–44)
AST SERPL-CCNC: 15 IU/L (ref 0–40)
BASOPHILS # BLD AUTO: 0.1 X10E3/UL (ref 0–0.2)
BASOPHILS NFR BLD AUTO: 0 %
BILIRUB SERPL-MCNC: 0.4 MG/DL (ref 0–1.2)
BUN SERPL-MCNC: 9 MG/DL (ref 6–20)
BUN/CREAT SERPL: 9 (ref 9–20)
CALCIUM SERPL-MCNC: 9.4 MG/DL (ref 8.7–10.2)
CHLORIDE SERPL-SCNC: 106 MMOL/L (ref 96–106)
CHOLEST SERPL-MCNC: 225 MG/DL (ref 100–199)
CO2 SERPL-SCNC: 23 MMOL/L (ref 20–29)
CREAT SERPL-MCNC: 0.97 MG/DL (ref 0.76–1.27)
EOSINOPHIL # BLD AUTO: 0.1 X10E3/UL (ref 0–0.4)
EOSINOPHIL NFR BLD AUTO: 1 %
ERYTHROCYTE [DISTWIDTH] IN BLOOD BY AUTOMATED COUNT: 13.2 % (ref 11.6–15.4)
FOLATE SERPL-MCNC: 3.9 NG/ML
GLOBULIN SER CALC-MCNC: 3.1 G/DL (ref 1.5–4.5)
GLUCOSE SERPL-MCNC: 83 MG/DL (ref 65–99)
HCT VFR BLD AUTO: 44.6 % (ref 37.5–51)
HCV AB S/CO SERPL IA: <0.1 S/CO RATIO (ref 0–0.9)
HDLC SERPL-MCNC: 43 MG/DL
HGB BLD-MCNC: 15.2 G/DL (ref 13–17.7)
IMM GRANULOCYTES # BLD AUTO: 0.1 X10E3/UL (ref 0–0.1)
IMM GRANULOCYTES NFR BLD AUTO: 1 %
LDLC SERPL CALC-MCNC: 156 MG/DL (ref 0–99)
LDLC/HDLC SERPL: 3.6 RATIO (ref 0–3.6)
LYMPHOCYTES # BLD AUTO: 4.5 X10E3/UL (ref 0.7–3.1)
LYMPHOCYTES NFR BLD AUTO: 34 %
MCH RBC QN AUTO: 29.8 PG (ref 26.6–33)
MCHC RBC AUTO-ENTMCNC: 34.1 G/DL (ref 31.5–35.7)
MCV RBC AUTO: 88 FL (ref 79–97)
MONOCYTES # BLD AUTO: 0.9 X10E3/UL (ref 0.1–0.9)
MONOCYTES NFR BLD AUTO: 7 %
NEUTROPHILS # BLD AUTO: 7.7 X10E3/UL (ref 1.4–7)
NEUTROPHILS NFR BLD AUTO: 57 %
PLATELET # BLD AUTO: 315 X10E3/UL (ref 150–450)
POTASSIUM SERPL-SCNC: 4.4 MMOL/L (ref 3.5–5.2)
PROT SERPL-MCNC: 7.9 G/DL (ref 6–8.5)
RBC # BLD AUTO: 5.1 X10E6/UL (ref 4.14–5.8)
SODIUM SERPL-SCNC: 141 MMOL/L (ref 134–144)
TRIGL SERPL-MCNC: 144 MG/DL (ref 0–149)
VIT B12 SERPL-MCNC: 333 PG/ML (ref 232–1245)
VLDLC SERPL CALC-MCNC: 26 MG/DL (ref 5–40)
WBC # BLD AUTO: 13.3 X10E3/UL (ref 3.4–10.8)

## 2021-08-27 DIAGNOSIS — E55.9 VITAMIN D DEFICIENCY: ICD-10-CM

## 2021-08-27 DIAGNOSIS — E53.8 VITAMIN B12 DEFICIENCY: Primary | ICD-10-CM

## 2021-08-27 DIAGNOSIS — D72.829 LEUKOCYTOSIS, UNSPECIFIED TYPE: ICD-10-CM

## 2021-08-27 RX ORDER — FAMOTIDINE 20 MG
2 TABLET ORAL DAILY
Start: 2021-08-27

## 2021-08-27 RX ORDER — LANOLIN ALCOHOL/MO/W.PET/CERES
2000 CREAM (GRAM) TOPICAL DAILY
Start: 2021-08-27

## 2021-09-30 ENCOUNTER — APPOINTMENT (OUTPATIENT)
Dept: SLEEP MEDICINE | Facility: HOSPITAL | Age: 26
End: 2021-09-30

## 2021-10-06 ENCOUNTER — OFFICE VISIT (OUTPATIENT)
Dept: SLEEP MEDICINE | Facility: HOSPITAL | Age: 26
End: 2021-10-06

## 2021-10-06 VITALS
HEIGHT: 75 IN | BODY MASS INDEX: 36.8 KG/M2 | HEART RATE: 72 BPM | DIASTOLIC BLOOD PRESSURE: 96 MMHG | WEIGHT: 296 LBS | SYSTOLIC BLOOD PRESSURE: 132 MMHG | OXYGEN SATURATION: 97 %

## 2021-10-06 DIAGNOSIS — G47.8 NON-RESTORATIVE SLEEP: ICD-10-CM

## 2021-10-06 DIAGNOSIS — G47.26 CIRCADIAN RHYTHM SLEEP DISORDER, SHIFT WORK TYPE: ICD-10-CM

## 2021-10-06 DIAGNOSIS — E66.9 OBESITY (BMI 30-39.9): ICD-10-CM

## 2021-10-06 DIAGNOSIS — G47.00 INSOMNIA, UNSPECIFIED TYPE: ICD-10-CM

## 2021-10-06 DIAGNOSIS — G47.10 HYPERSOMNIA: ICD-10-CM

## 2021-10-06 DIAGNOSIS — F51.5 NIGHTMARES: ICD-10-CM

## 2021-10-06 DIAGNOSIS — G47.30 SLEEP APNEA, UNSPECIFIED TYPE: Primary | ICD-10-CM

## 2021-10-06 PROCEDURE — G0463 HOSPITAL OUTPT CLINIC VISIT: HCPCS

## 2021-10-06 PROCEDURE — 99204 OFFICE O/P NEW MOD 45 MIN: CPT | Performed by: FAMILY MEDICINE

## 2021-10-06 NOTE — PROGRESS NOTES
[facility] New Patient/Consultation       Reason for Consultation: Snoring sleep disturbance      Patient Care Team:  Padmini Mcconnell APRN as PCP - General (Family Medicine)  Floyd Thomas MD as Consulting Physician (Sleep Medicine)      History of present illness:  Thank you for asking me to see your patient.  The patient is a 26 y.o. male with vitamin B12 deficiency folate deficiency vitamin D deficiency chronic right shoulder pain presents today with concern for sleep disorder due to snoring hypersomnia nonrestorative sleep.  Patient reports he has trouble falling asleep and staying asleep for over 1 year now.  Had a sleep study in 2012 which was negative for obstructive sleep apnea.  He does work rotating shifts; Friday and Saturday works day shift for 12 hours/day Sunday and Monday night works night shifts for 12 hours per shift.  Reports snoring waking up gasping for breath and dry mouth in the morning.  Also reports frequent nightmares.  There is a family history of obstructive sleep apnea patient is obese with BMI of 37.  Frequent nightmares; recurring theme of protecting his family however different settings.  Not PTSD related.  Was in the .  Dreams are not related to reliving previous scenarios he has been on.  Fiancé notes that sometimes he moves around in dreams right before he is about to wake up.  Has not hurt himself or fiancé during dreams.    Sleep Schedule:  Bed time: Thursday through Saturday 9 PM Sunday through Monday 7 AM  Sleep latency: 1 to 2 hours  Wake time: Friday states that Saturday 4 AM Sunday Monday 2 PM  Average hours slept: 3-6  Non-restorative sleep: Yes  Number of naps per day: 0  Rotating shifts?:  Yes as noted above  Nocturia: No  Electronics in bedroom: Yes    Excessive daytime sleepiness or drowsiness:Y  Any accidents at work due to sleepiness in the last 5 years:N  Any difficulty driving due to sleepiness or being drowsy: N  Weight changed in the last 5  "years:N    Snoring:Y  Witnessed apneas:N  Have you ever awakened gasping for breath, coughing, choking or respiratory discomfort: Y  Morning headaches: N  Awaken with a sore throat or dry mouth: Y    Any reports of leg jerking at night: N  Urge sensations: N  Does pain disrupt sleep: N  Sweating during sleep: N  Teeth grinding: N    Any sudden episodes of sleep during the day: N  Sleep paralysis/hallucinations: N  Muscle weakness with laughing/anger: N  Nightmares: Y  Sleep walking: N    Are you sleepy when you increase your sleep time: N  Do you sleep better away from your own bed: N    ESS: 2    Social History: Quarter pack of cigarettes from age 15 no alcohol use no drug use 3-4 caffeinated beverages a day    Review of Systems:    A complete review of systems was done and all were negative with the exception of all negative    Allergies:  Cyclobenzaprine    Family History: VISHAL yes       Current Outpatient Medications:   •  methylPREDNISolone (MEDROL) 4 MG dose pack, follow package directions, Disp: , Rfl:   •  oxaprozin (DAYPRO) 600 MG tablet, Take 600 mg by mouth Every 12 (Twelve) Hours., Disp: , Rfl:   •  vitamin B-12 (CYANOCOBALAMIN) 1000 MCG tablet, Take 2 tablets by mouth Daily., Disp: , Rfl:   •  Vitamin D, Cholecalciferol, 25 MCG (1000 UT) capsule, Take 2 capsules by mouth Daily., Disp: , Rfl:     Vital Signs:    Vitals:    10/06/21 1300   BP: 132/96   Pulse: 72   SpO2: 97%   Weight: 134 kg (296 lb)   Height: 190.5 cm (75\")      Body mass index is 37 kg/m².  Neck Circumference: 18.5 inches      Physical Exam:   General Alert and oriented. No acute distress noted   Pharynx/Throat Class III Mallampati airway, large tongue, no evidence of redundant lateral pharyngeal tissue. No oral lesions. No thrush. Moist mucous membranes.   Head Normocephalic. Symmetrical. Atraumatic.    Nose No septal deviation. No drainage   Chest Wall Normal shape. Symmetric expansion with respiration. No tenderness.   Neck Trachea " midline, no thyromegaly or adenopathy    Lungs Clear to auscultation bilaterally. No wheezes. No rhonchi. No rales. Respirations regular, even and unlabored.   Heart Regular rhythm and normal rate. Normal S1 and S2. No murmur   Abdomen Soft, non-tender and non-distended. Normal bowel sounds. No masses.   Extremities Moves all extremities well. No edema   Psychiatric Normal mood and affect.       Impression:  1. Sleep apnea, unspecified type    2. Hypersomnia    3. Non-restorative sleep    4. Obesity (BMI 30-39.9)    5. Circadian rhythm sleep disorder, shift work type    6. Nightmares    7. Insomnia, unspecified type        Plan:    Good sleep hygiene measures should be maintained.  Weight loss would be beneficial in this patient who is obese with BMI 37.    I discussed the pathophysiology of obstructive sleep apnea with the patient.  We discussed the adverse outcomes associated with untreated sleep-disordered breathing.  We discussed treatment modalities of obstructive sleep apnea including CPAP device as well as oral mandibular advancement device. Sleep study will be scheduled to establish definitive diagnosis of sleep disorder breathing.  Weight loss will be strongly beneficial in order to reduce the severity of sleep-disordered breathing.  Patient has narrow oropharyngeal structure.  Caution during activities that require prolonged concentration is strongly advised.  Patient will be notified of sleep study results after sleep study is completed.  If sleep apnea is only mild,  oral mandibular advancement device may be one of the treatment options.  However if sleep apnea is moderately severe, CPAP treatment will be strongly encouraged.  The patient is not opposed to treatment with CPAP device if we confirm significant obstructive sleep apnea on polysomnography.    Concern for possible REM behavior disorder; melatonin makes him feel more awake however.  Not interested in clonazepam.    Need to consider referral for  CBT-I for primary insomnia if negative HST.    Thank you for allowing me to participate in your patient's care.    Floyd Thomas MD  Sleep Medicine  10/06/21  14:33 EDT

## 2021-10-26 ENCOUNTER — HOSPITAL ENCOUNTER (OUTPATIENT)
Dept: SLEEP MEDICINE | Facility: HOSPITAL | Age: 26
Discharge: HOME | End: 2021-10-26
Admitting: FAMILY MEDICINE

## 2021-10-26 DIAGNOSIS — G47.10 HYPERSOMNIA: ICD-10-CM

## 2021-10-26 DIAGNOSIS — E66.9 OBESITY (BMI 30-39.9): ICD-10-CM

## 2021-10-26 DIAGNOSIS — G47.8 NON-RESTORATIVE SLEEP: ICD-10-CM

## 2021-10-26 DIAGNOSIS — G47.30 SLEEP APNEA, UNSPECIFIED TYPE: ICD-10-CM

## 2021-10-26 DIAGNOSIS — G47.26 CIRCADIAN RHYTHM SLEEP DISORDER, SHIFT WORK TYPE: ICD-10-CM

## 2021-10-26 PROCEDURE — 95806 SLEEP STUDY UNATT&RESP EFFT: CPT

## 2021-10-26 PROCEDURE — 95806 SLEEP STUDY UNATT&RESP EFFT: CPT | Performed by: FAMILY MEDICINE

## 2021-11-11 ENCOUNTER — TELEPHONE (OUTPATIENT)
Dept: SLEEP MEDICINE | Facility: HOSPITAL | Age: 26
End: 2021-11-11

## 2021-11-12 ENCOUNTER — TELEPHONE (OUTPATIENT)
Dept: SLEEP MEDICINE | Facility: HOSPITAL | Age: 26
End: 2021-11-12

## 2021-11-15 ENCOUNTER — TELEPHONE (OUTPATIENT)
Dept: SLEEP MEDICINE | Facility: HOSPITAL | Age: 26
End: 2021-11-15

## 2021-11-15 NOTE — TELEPHONE ENCOUNTER
Patient works 3rd shift , needs appts and phone calls after 2 pm . Patient requested [facility]  As DME . Follow up scheduled 1/111/21 @ 1600

## 2022-01-19 ENCOUNTER — OFFICE VISIT (OUTPATIENT)
Dept: SLEEP MEDICINE | Facility: HOSPITAL | Age: 27
End: 2022-01-19

## 2022-01-19 VITALS
HEIGHT: 75 IN | HEART RATE: 97 BPM | DIASTOLIC BLOOD PRESSURE: 77 MMHG | SYSTOLIC BLOOD PRESSURE: 140 MMHG | WEIGHT: 295 LBS | OXYGEN SATURATION: 97 % | BODY MASS INDEX: 36.68 KG/M2

## 2022-01-19 DIAGNOSIS — G47.33 OBSTRUCTIVE SLEEP APNEA: Primary | ICD-10-CM

## 2022-01-19 DIAGNOSIS — E66.9 OBESITY (BMI 30-39.9): ICD-10-CM

## 2022-01-19 PROCEDURE — G0463 HOSPITAL OUTPT CLINIC VISIT: HCPCS

## 2022-01-19 PROCEDURE — 99213 OFFICE O/P EST LOW 20 MIN: CPT | Performed by: FAMILY MEDICINE

## 2022-01-19 NOTE — PROGRESS NOTES
Follow Up [facility] Disorders [facility] Note     Chief Complaint:  VISHAL     Primary Care Physician: Padmini Mcconnell APRN    Ignacio Shore is a 26 y.o.male  was last seen at [facility] sleep lab: 10/26/2021 for home sleep study.  When I first saw patient there was concern for sleep disorder due to snoring hypersomnia nonrestorative sleep difficulty falling asleep and staying asleep.  Also noted shiftwork disorder snoring waking up gasping for breath and dry mouth in the morning as well as frequent nightmares.  Raul had noted concern that he sometimes moves around during dreams right before he is about to wake up.  We discussed concern for possible REM behavior disorder; patient discussed melatonin makes him feel more awake and he was understood and clonazepam for therapy.  We discussed considering referral to CBT-I for primary insomnia if sleep study would be negative.    Home sleep study was positive for obstructive sleep apnea with overall AHI 23.4 events per hour with lowest SPO2 of 79%.  Advised auto CPAP 6-18 cm H2O.  Presents today for first follow-up visit since starting CPAP machine.    Patient has not yet received machine.    Current Medications:    Current Outpatient Medications:   •  methylPREDNISolone (MEDROL) 4 MG dose pack, follow package directions, Disp: , Rfl:   •  oxaprozin (DAYPRO) 600 MG tablet, Take 600 mg by mouth Every 12 (Twelve) Hours., Disp: , Rfl:   •  vitamin B-12 (CYANOCOBALAMIN) 1000 MCG tablet, Take 2 tablets by mouth Daily., Disp: , Rfl:   •  Vitamin D, Cholecalciferol, 25 MCG (1000 UT) capsule, Take 2 capsules by mouth Daily., Disp: , Rfl:    also entered in Sleep Questionnaire    Patient  has a past medical history of Abnormal LFTs (liver function tests) (7/12/2016), Folate deficiency, Nasal fracture, Renal glycosuria (CMS/HCC) (8/6/2014), Right lateral epicondylitis (5/2/2016), Vitamin B12 deficiency, and Vitamin D deficiency.    Social History:    Social History     Socioeconomic History   •  "Marital status: Significant Other   Tobacco Use   • Smoking status: Current Some Day Smoker     Packs/day: 0.25     Types: Cigarettes   • Smokeless tobacco: Never Used   Substance and Sexual Activity   • Alcohol use: Never   • Drug use: Never   • Sexual activity: Yes     Partners: Female     Comment: wife        Allergies:  Cyclobenzaprine    Vital Signs:    Vitals:    01/19/22 1500   BP: 140/77   Pulse: 97   SpO2: 97%   Weight: 134 kg (295 lb)   Height: 190.5 cm (75\")     Body mass index is 36.87 kg/m².    REVIEW OF SYSTEMS.  Full review of systems available on the intake form which is scanned in the media tab.  The relevant positive are noted below  1. Daytime excessive sleepiness with [place] Sleepiness Scale :Total score: 2   2. Snoring  3. All negative      Physical exam:  Vitals:    01/19/22 1500   BP: 140/77   Pulse: 97   SpO2: 97%   Weight: 134 kg (295 lb)   Height: 190.5 cm (75\")    Body mass index is 36.87 kg/m².    HEENT: Head is atraumatic, normocephalic  Eyes: pupils are round equal and reacting to light and accommodation, conjunctiva normal  Nose: no nasal septal defects or deviation and the nasal passages are clear, no nasal polyps,  Throat:tongue normal, oral airway Mallampati class iii  NECK: , trachea is in the midline, thyroid not enlarged  RESPIRATORY SYSTEM: Breath sounds are equal on both sides, there are no wheezes   CARDIOVASULAR SYSTEM: Heart sounds are regular rhythm and rancho rate, no edema  EXTREMITES: No cyanosis, clubbing  NEUROLOGICAL SYSTEM: Oriented x 3, no gross motor defects, gait normal    Impression:  1. Obstructive sleep apnea    2. Obesity (BMI 30-39.9)        We will place another order for auto CPAP 6/18 cm H2O.  Return to clinic after at least 1 month use of Pap for follow-up or sooner if needed.  Weight loss will be strongly beneficial to reduce the severity of sleep-disordered breathing.  Caution during activities that require prolonged concentration is strongly advised if " sleepiness returns. Changing of PAP supplies regularly is important for effective use. Patient needs to change cushion on the mask or plugs on nasal pillows along with disposable filters once every month and change mask frame, tubing, headgear and Velcro straps every 6 months at the minimum.    Time spent during visit: 20 minutes of which at least 50% of the time was spent counseling patient.    Floyd Thomas MD  Sleep Medicine  01/19/22  15:19 EST

## 2023-05-05 ENCOUNTER — OFFICE VISIT (OUTPATIENT)
Dept: FAMILY MEDICINE CLINIC | Facility: CLINIC | Age: 28
End: 2023-05-05
Payer: COMMERCIAL

## 2023-05-05 VITALS
HEIGHT: 76 IN | WEIGHT: 303 LBS | TEMPERATURE: 98 F | SYSTOLIC BLOOD PRESSURE: 132 MMHG | DIASTOLIC BLOOD PRESSURE: 80 MMHG | HEART RATE: 89 BPM | BODY MASS INDEX: 36.9 KG/M2 | OXYGEN SATURATION: 97 %

## 2023-05-05 DIAGNOSIS — Z00.00 ENCOUNTER FOR ANNUAL PHYSICAL EXAM: Primary | ICD-10-CM

## 2023-05-05 DIAGNOSIS — E78.2 MIXED HYPERLIPIDEMIA: ICD-10-CM

## 2023-05-05 DIAGNOSIS — D72.829 LEUKOCYTOSIS, UNSPECIFIED TYPE: ICD-10-CM

## 2023-05-05 DIAGNOSIS — E55.9 VITAMIN D DEFICIENCY: ICD-10-CM

## 2023-05-05 DIAGNOSIS — E53.8 FOLIC ACID DEFICIENCY: ICD-10-CM

## 2023-05-05 DIAGNOSIS — E53.8 VITAMIN B12 DEFICIENCY: ICD-10-CM

## 2023-05-05 DIAGNOSIS — G47.33 OBSTRUCTIVE SLEEP APNEA: ICD-10-CM

## 2023-05-05 PROBLEM — N45.1 EPIDIDYMITIS, RIGHT: Status: RESOLVED | Noted: 2021-01-26 | Resolved: 2023-05-05

## 2023-05-05 PROBLEM — N50.811 TESTICULAR PAIN, RIGHT: Status: RESOLVED | Noted: 2020-12-14 | Resolved: 2023-05-05

## 2023-05-05 PROCEDURE — 99395 PREV VISIT EST AGE 18-39: CPT | Performed by: NURSE PRACTITIONER

## 2023-05-05 NOTE — PATIENT INSTRUCTIONS
Come back for fasting labs.  Continue to work on healthy diet and exercise.  Follow up pending lab results.  Follow up in 1 year for annual physical, or sooner if problems or concerns.

## 2023-05-05 NOTE — PROGRESS NOTES
Subjective   Ignacio Shore is a 27 y.o. male.     Chief Complaint   Patient presents with   • Annual Exam     Blood work, work note         History of Present Illness   Patient presents for CPE with fasting labs; somewhat healthy diet, has not been eating as healthy last couple of months since has been working out of town recently; some exercise, has physical job and walks 10,000 steps or more daily; regular dental visits; last eye exam 8/2022, wears glasses; no problems hearing; immunizations: declines COVID-19 virus and PCV20 vaccines; colonoscopy never performed; no family history of colon cancer.    Was seen at [facility] on 4/17/23 for aches, cough and congestion for 2-3 days; was negative for COVID-19 and flu virus; had opened cable box on side of apartment building and bird was living in there; was exposed to allergens--pollen flew up in face; was blowing pollen out of nose for 3 days; tried Sudafed and did not help; woke up sneezing and coughing next day; was seen at [facility] and told to take off rest off week; recommended to start Claritin and Flonase daily; also gave Rx for Tessalon Perles; symptoms improved with Flonase; was off work 4/17/23--4/21/23, returned to work 4/23/23; works as  and needs [place] paper work completed covering time off.      The following portions of the patient's history were reviewed and updated as appropriate: allergies, current medications, past family history, past medical history, past social history, past surgical history and problem list.      Current Outpatient Medications   Medication Sig Dispense Refill   • vitamin B-12 (CYANOCOBALAMIN) 1000 MCG tablet Take 2 tablets by mouth Daily.     • Vitamin D, Cholecalciferol, 25 MCG (1000 UT) capsule Take 2 capsules by mouth Daily.       No current facility-administered medications for this visit.       Past Medical History:   Diagnosis Date   • Abnormal LFTs (liver function tests) 7/12/2016   • Epididymitis, right 1/26/2021    • Folate deficiency    • Nasal fracture    • Renal glycosuria 8/6/2014   • Right lateral epicondylitis 5/2/2016   • Vitamin B12 deficiency    • Vitamin D deficiency        Past Surgical History:   Procedure Laterality Date   • EPIDIDYMECTOMY  02/2021   • SHOULDER SURGERY Right 03/14/2019    repair of labral tears       Family History   Problem Relation Age of Onset   • Diabetes Father    • Hypertension Father    • Cancer Maternal Aunt         unknown    • Kidney disease Paternal Grandfather    • Diabetes Paternal Grandfather    • COPD Maternal Grandmother    • Coronary artery disease Maternal Grandfather         S/P CABG       Social History     Socioeconomic History   • Marital status: Significant Other   Tobacco Use   • Smoking status: Some Days     Packs/day: 0.25     Types: Cigarettes   • Smokeless tobacco: Never   Substance and Sexual Activity   • Alcohol use: Never   • Drug use: Never   • Sexual activity: Yes     Partners: Female     Comment: wife        Review of Systems   Constitutional: Negative for appetite change, chills, fatigue, fever, unexpected weight gain and unexpected weight loss.   HENT: Negative for ear pain, sinus pressure, sore throat and trouble swallowing.    Eyes: Negative for blurred vision and discharge.   Respiratory: Negative for cough, chest tightness and shortness of breath. Apnea: needs to start CPAP, waiting on machine.    Cardiovascular: Negative for chest pain and palpitations.   Gastrointestinal: Negative for abdominal pain, blood in stool, constipation, diarrhea, GERD and indigestion (only if eats something spicy).   Endocrine: Negative for cold intolerance, heat intolerance and polydipsia.   Genitourinary: Negative for dysuria and frequency.   Musculoskeletal: Negative for back pain. Arthralgias: has trouble with right shoulder at times; has seen ortho in past.   Skin: Negative for rash and skin lesions.   Neurological: Negative for dizziness, syncope, light-headedness and  "headache.   Hematological: Does not bruise/bleed easily.   Psychiatric/Behavioral: Negative for depressed mood. Sleep disturbance: has trouble falling asleep; no OTC treatment. The patient is not nervous/anxious.        Objective   Vitals:    05/05/23 1430   BP: 132/80   BP Location: Left arm   Patient Position: Sitting   Cuff Size: Adult   Pulse: 89   Temp: 98 °F (36.7 °C)   SpO2: 97%   Weight: (!) 137 kg (303 lb)   Height: 193 cm (76\")     Body mass index is 36.88 kg/m².    Physical Exam  Vitals and nursing note reviewed.   Constitutional:       General: He is not in acute distress.     Appearance: He is well-developed and well-groomed. He is not diaphoretic.   HENT:      Head: Normocephalic and atraumatic.      Jaw: No tenderness or pain on movement.      Right Ear: Tympanic membrane and external ear normal. No decreased hearing noted.      Left Ear: Tympanic membrane and external ear normal. No decreased hearing noted.      Nose: Nose normal.      Right Sinus: No maxillary sinus tenderness or frontal sinus tenderness.      Left Sinus: No maxillary sinus tenderness or frontal sinus tenderness.      Mouth/Throat:      Mouth: Mucous membranes are moist.      Pharynx: No oropharyngeal exudate or posterior oropharyngeal erythema.   Eyes:      Extraocular Movements: Extraocular movements intact.      Conjunctiva/sclera: Conjunctivae normal.      Pupils: Pupils are equal, round, and reactive to light.   Neck:      Thyroid: No thyromegaly.      Vascular: No carotid bruit.      Trachea: No tracheal deviation.   Cardiovascular:      Rate and Rhythm: Normal rate and regular rhythm.      Pulses: Normal pulses.      Heart sounds: Normal heart sounds. No murmur heard.  Pulmonary:      Effort: Pulmonary effort is normal. No respiratory distress.      Breath sounds: Normal breath sounds.   Abdominal:      General: Bowel sounds are normal.      Palpations: Abdomen is soft. There is no hepatomegaly or splenomegaly.      " Tenderness: There is no abdominal tenderness. There is no guarding.   Musculoskeletal:         General: Normal range of motion.      Cervical back: Normal range of motion and neck supple. No bony tenderness.      Thoracic back: No bony tenderness.      Lumbar back: No bony tenderness.      Right lower leg: No edema.      Left lower leg: No edema.   Lymphadenopathy:      Cervical: No cervical adenopathy.   Skin:     General: Skin is warm and dry.      Findings: No rash.   Neurological:      Mental Status: He is alert and oriented to person, place, and time.      Cranial Nerves: No cranial nerve deficit.      Motor: Motor function is intact.      Coordination: Coordination normal.      Gait: Gait normal.      Deep Tendon Reflexes: Reflexes are normal and symmetric.   Psychiatric:         Mood and Affect: Mood normal.         Behavior: Behavior normal.         Thought Content: Thought content normal.         Cognition and Memory: Cognition normal.         Judgment: Judgment normal.         Lab Results   Component Value Date    WBC 13.3 (H) 08/25/2021    RBC 5.10 08/25/2021    HGB 15.2 08/25/2021    HCT 44.6 08/25/2021    MCV 88 08/25/2021    MCH 29.8 08/25/2021    MCHC 34.1 08/25/2021    RDW 13.2 08/25/2021     08/25/2021    NEUTRORELPCT 57 08/25/2021    LYMPHORELPCT 34 08/25/2021    MONORELPCT 7 08/25/2021    EOSRELPCT 1 08/25/2021    BASORELPCT 0 08/25/2021    NEUTROABS 7.7 (H) 08/25/2021    LYMPHSABS 4.5 (H) 08/25/2021    MONOSABS 0.9 08/25/2021    EOSABS 0.1 08/25/2021    BASOSABS 0.1 08/25/2021     Lab Results   Component Value Date    GLUCOSE 83 08/25/2021    BUN 9 08/25/2021    CREATININE 0.97 08/25/2021    EGFRIFNONA 107 08/25/2021    EGFRIFAFRI 123 08/25/2021    BCR 9 08/25/2021    K 4.4 08/25/2021    CO2 23 08/25/2021    CALCIUM 9.4 08/25/2021    PROTENTOTREF 7.9 08/25/2021    ALBUMIN 4.8 08/25/2021    LABIL2 1.5 08/25/2021    AST 15 08/25/2021    ALT 33 08/25/2021      Lab Results   Component Value  Date    CHLPL 225 (H) 08/25/2021    TRIG 144 08/25/2021    HDL 43 08/25/2021    VLDL 26 08/25/2021     (H) 08/25/2021     Records reviewed from [place] urgent care on 4/17/2023; patient presented with aching in shoulders, congestion, and cough x2 days; negative for COVID-19 and flu virus; diagnosed with viral URI; was given Rx for Tessalon Perles and instructed to try Claritin and Flonase.    Assessment    Problem List Items Addressed This Visit     Vitamin B12 deficiency    Relevant Orders    Vitamin B12 & Folate    Folic acid deficiency    Relevant Orders    Vitamin B12 & Folate    Vitamin D deficiency    Relevant Orders    Vitamin D,25-Hydroxy    Obstructive sleep apnea    Current Assessment & Plan     Start CPAP when get machine.         Relevant Orders    CBC & Differential    Mixed hyperlipidemia    Relevant Orders    Comprehensive Metabolic Panel    Lipid Panel With LDL / HDL Ratio   Other Visit Diagnoses     Encounter for annual physical exam    -  Primary    Relevant Orders    Vitamin D,25-Hydroxy    Vitamin B12 & Folate    Comprehensive Metabolic Panel    Lipid Panel With LDL / HDL Ratio    CBC & Differential    Leukocytosis, unspecified type        Relevant Orders    CBC & Differential           Come back for fasting labs.  Return in about 1 year (around 5/5/2024) for Annual physical, Recheck.  or sooner if problems or concerns.  Impression: Health maintenance visit.  Currently, eats a somewhat healthy diet and has a regular exercise routine.  Colorectal cancer screening: not indicated.  Prostate cancer screening: not indicated.  Screening lab work includes: CMP, lipid.  Immunizations: declines COVID-19 virus and PCV20 vaccines; risks and benefits of immunizations were discussed with patient.  Advice and education were given regarding nutrition and aerobic exercise.    Paperwork completed for short term disability/FMLA last month when patient had been seen at urgent care with illness.

## 2023-05-06 NOTE — ASSESSMENT & PLAN NOTE
Patient's (Body mass index is 36.88 kg/m².) indicates that they are morbidly/severely obese (BMI > 40 or > 35 with obesity - related health condition) with health conditions that include obstructive sleep apnea . Weight is worsening. BMI  is above average; BMI management plan is completed. We discussed low calorie, low carb based diet program, portion control and increasing exercise.

## 2023-05-09 DIAGNOSIS — E55.9 VITAMIN D DEFICIENCY: ICD-10-CM

## 2023-05-09 DIAGNOSIS — E53.8 FOLIC ACID DEFICIENCY: ICD-10-CM

## 2023-05-09 DIAGNOSIS — E78.2 MIXED HYPERLIPIDEMIA: ICD-10-CM

## 2023-05-09 DIAGNOSIS — D72.829 LEUKOCYTOSIS, UNSPECIFIED TYPE: ICD-10-CM

## 2023-05-09 DIAGNOSIS — Z00.00 ENCOUNTER FOR ANNUAL PHYSICAL EXAM: ICD-10-CM

## 2023-05-09 DIAGNOSIS — G47.33 OBSTRUCTIVE SLEEP APNEA: ICD-10-CM

## 2023-05-09 DIAGNOSIS — E53.8 VITAMIN B12 DEFICIENCY: ICD-10-CM

## 2023-07-01 LAB
25(OH)D3+25(OH)D2 SERPL-MCNC: 25 NG/ML (ref 30–100)
ALBUMIN SERPL-MCNC: 4.8 G/DL (ref 3.5–5.2)
ALBUMIN/GLOB SERPL: 1.7 G/DL
ALP SERPL-CCNC: 55 U/L (ref 39–117)
ALT SERPL-CCNC: 44 U/L (ref 1–41)
AST SERPL-CCNC: 25 U/L (ref 1–40)
BASOPHILS # BLD AUTO: 0.05 10*3/MM3 (ref 0–0.2)
BASOPHILS NFR BLD AUTO: 0.6 % (ref 0–1.5)
BILIRUB SERPL-MCNC: 0.2 MG/DL (ref 0–1.2)
BUN SERPL-MCNC: 10 MG/DL (ref 6–20)
BUN/CREAT SERPL: 9.9 (ref 7–25)
CALCIUM SERPL-MCNC: 9.9 MG/DL (ref 8.6–10.5)
CHLORIDE SERPL-SCNC: 106 MMOL/L (ref 98–107)
CHOLEST SERPL-MCNC: 218 MG/DL (ref 0–200)
CO2 SERPL-SCNC: 27.3 MMOL/L (ref 22–29)
CREAT SERPL-MCNC: 1.01 MG/DL (ref 0.76–1.27)
EGFRCR SERPLBLD CKD-EPI 2021: 103.2 ML/MIN/1.73
EOSINOPHIL # BLD AUTO: 0.2 10*3/MM3 (ref 0–0.4)
EOSINOPHIL NFR BLD AUTO: 2.2 % (ref 0.3–6.2)
ERYTHROCYTE [DISTWIDTH] IN BLOOD BY AUTOMATED COUNT: 12.8 % (ref 12.3–15.4)
FOLATE SERPL-MCNC: 4.42 NG/ML (ref 4.78–24.2)
GLOBULIN SER CALC-MCNC: 2.9 GM/DL
GLUCOSE SERPL-MCNC: 107 MG/DL (ref 65–99)
HCT VFR BLD AUTO: 43.8 % (ref 37.5–51)
HDLC SERPL-MCNC: 31 MG/DL (ref 40–60)
HGB BLD-MCNC: 14.9 G/DL (ref 13–17.7)
IMM GRANULOCYTES # BLD AUTO: 0.03 10*3/MM3 (ref 0–0.05)
IMM GRANULOCYTES NFR BLD AUTO: 0.3 % (ref 0–0.5)
LDLC SERPL CALC-MCNC: 135 MG/DL (ref 0–100)
LDLC/HDLC SERPL: 4.17 {RATIO}
LYMPHOCYTES # BLD AUTO: 3.35 10*3/MM3 (ref 0.7–3.1)
LYMPHOCYTES NFR BLD AUTO: 37.2 % (ref 19.6–45.3)
MCH RBC QN AUTO: 28.8 PG (ref 26.6–33)
MCHC RBC AUTO-ENTMCNC: 34 G/DL (ref 31.5–35.7)
MCV RBC AUTO: 84.7 FL (ref 79–97)
MONOCYTES # BLD AUTO: 0.73 10*3/MM3 (ref 0.1–0.9)
MONOCYTES NFR BLD AUTO: 8.1 % (ref 5–12)
NEUTROPHILS # BLD AUTO: 4.65 10*3/MM3 (ref 1.7–7)
NEUTROPHILS NFR BLD AUTO: 51.6 % (ref 42.7–76)
NRBC BLD AUTO-RTO: 0.1 /100 WBC (ref 0–0.2)
PLATELET # BLD AUTO: 274 10*3/MM3 (ref 140–450)
POTASSIUM SERPL-SCNC: 4.6 MMOL/L (ref 3.5–5.2)
PROT SERPL-MCNC: 7.7 G/DL (ref 6–8.5)
RBC # BLD AUTO: 5.17 10*6/MM3 (ref 4.14–5.8)
SODIUM SERPL-SCNC: 142 MMOL/L (ref 136–145)
TRIGL SERPL-MCNC: 288 MG/DL (ref 0–150)
VIT B12 SERPL-MCNC: 366 PG/ML (ref 211–946)
VLDLC SERPL CALC-MCNC: 52 MG/DL (ref 5–40)
WBC # BLD AUTO: 9.01 10*3/MM3 (ref 3.4–10.8)